# Patient Record
Sex: FEMALE | Race: WHITE | URBAN - METROPOLITAN AREA
[De-identification: names, ages, dates, MRNs, and addresses within clinical notes are randomized per-mention and may not be internally consistent; named-entity substitution may affect disease eponyms.]

---

## 2025-04-16 DIAGNOSIS — Z82.79 FAMILY HISTORY OF BICUSPID AORTIC VALVE: Primary | ICD-10-CM

## 2025-05-09 ENCOUNTER — HOSPITAL ENCOUNTER (OUTPATIENT)
Dept: CARDIOLOGY | Facility: CLINIC | Age: 25
Discharge: HOME | End: 2025-05-09
Payer: COMMERCIAL

## 2025-05-09 DIAGNOSIS — Z82.79 FAMILY HISTORY OF BICUSPID AORTIC VALVE: ICD-10-CM

## 2025-05-09 LAB
AORTIC VALVE PEAK VELOCITY: 1.03 M/S
AV PEAK GRADIENT: 4 MMHG
AVA (PEAK VEL): 3.33 CM2
EJECTION FRACTION APICAL 4 CHAMBER: 65.7
EJECTION FRACTION: 65 %
LEFT ATRIUM VOLUME AREA LENGTH INDEX BSA: 20.5 ML/M2
LEFT VENTRICLE INTERNAL DIMENSION DIASTOLE: 4.49 CM (ref 3.5–6)
LEFT VENTRICULAR OUTFLOW TRACT DIAMETER: 2.08 CM
MITRAL VALVE E/A RATIO: 1.73
RIGHT VENTRICLE FREE WALL PEAK S': 15 CM/S
TRICUSPID ANNULAR PLANE SYSTOLIC EXCURSION: 2.2 CM

## 2025-05-09 PROCEDURE — 93306 TTE W/DOPPLER COMPLETE: CPT | Performed by: INTERNAL MEDICINE

## 2025-05-09 PROCEDURE — 93306 TTE W/DOPPLER COMPLETE: CPT

## 2025-05-12 DIAGNOSIS — R93.1 ABNORMAL ECHOCARDIOGRAM: Primary | ICD-10-CM

## 2025-06-06 PROBLEM — R94.31 EKG ABNORMALITY: Status: ACTIVE | Noted: 2020-11-15

## 2025-06-06 PROBLEM — R55 SYNCOPE: Status: ACTIVE | Noted: 2020-11-15

## 2025-06-06 PROBLEM — F41.9 ANXIETY: Status: ACTIVE | Noted: 2025-06-06

## 2025-06-06 PROBLEM — R00.0 TACHYCARDIA: Status: ACTIVE | Noted: 2020-11-15

## 2025-06-06 PROBLEM — F12.10 MARIJUANA ABUSE: Status: ACTIVE | Noted: 2020-11-15

## 2025-07-01 ENCOUNTER — TELEMEDICINE (OUTPATIENT)
Dept: CARDIOLOGY | Facility: CLINIC | Age: 25
End: 2025-07-01
Payer: COMMERCIAL

## 2025-07-01 DIAGNOSIS — R94.31 ABNORMAL ELECTROCARDIOGRAM (ECG) (EKG): ICD-10-CM

## 2025-07-01 DIAGNOSIS — Q24.8: ICD-10-CM

## 2025-07-01 DIAGNOSIS — Z82.79 FAMILY HISTORY OF BICUSPID AORTIC VALVE: Primary | ICD-10-CM

## 2025-07-01 PROCEDURE — 1036F TOBACCO NON-USER: CPT | Performed by: STUDENT IN AN ORGANIZED HEALTH CARE EDUCATION/TRAINING PROGRAM

## 2025-07-01 PROCEDURE — 99203 OFFICE O/P NEW LOW 30 MIN: CPT | Performed by: STUDENT IN AN ORGANIZED HEALTH CARE EDUCATION/TRAINING PROGRAM

## 2025-07-01 NOTE — PROGRESS NOTES
"Referred by Dr. Doss ref. provider found for No chief complaint on file.     History Of Present Illness:    Shobha Hemphill is a 24 y.o. female without significant past medical history who is here for evaluation of an echocardiogram.  Patient underwent echocardiogram as apparently family member presumably mother was found to have bicuspid aortic valve disease.  Her echocardiogram shows presence of trileaflet aortic valve is mild thickening along the right coronary cusp but no evidence of bicuspid valve or physiology.  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  Patient is asymptomatic at baseline.  She is very functional and works at [employer] and is on her feet all day.  She has no limitations to activity.  Previous ECG from 11/15/2020 shows the presence of sinus rhythm when appears to be right axis deviation incomplete right bundle branch block morphology.      Past Medical History:  She has a past medical history of Acute upper respiratory infection, unspecified (04/25/2014), Other specified health status, Personal history of other diseases of the respiratory system (08/05/2014), Personal history of other diseases of the respiratory system (04/25/2014), and Unspecified otitis externa, left ear (06/26/2014).    Past Surgical History:  She has no past surgical history on file.      Social History:  She reports that she has never smoked. She has never used smokeless tobacco. She reports that she does not currently use alcohol. She reports that she does not use drugs.    Family History:  Family History[1]     Allergies:  Patient has no allergy information on record.    Outpatient Medications:  No current outpatient medications     Last Recorded Vitals:  There were no vitals filed for this visit.    Physical Exam:  Virtual visit  @[facility]->[facility](CIRCULAR REFERENCE)@       Last Labs:  CBC -  No results found for: \"WBC\", \"HGB\", \"HCT\", " "\"MCV\", \"PLT\"    CMP -  No results found for: \"CALCIUM\", \"PHOS\", \"PROT\", \"ALBUMIN\", \"AST\", \"ALT\", \"ALKPHOS\", \"BILITOT\"    LIPID PANEL -   No results found for: \"CHOL\", \"TRIG\", \"HDL\", \"CHHDL\", \"LDLF\", \"VLDL\", \"NHDL\"    RENAL FUNCTION PANEL -   No results found for: \"GLUCOSE\", \"NA\", \"K\", \"CL\", \"CO2\", \"ANIONGAP\", \"BUN\", \"CREATININE\", \"GFRMALE\", \"CALCIUM\", \"PHOS\", \"ALBUMIN\"     No results found for: \"BNP\", \"HGBA1C\"    Last Cardiology Tests:  ECG:  No results found for this or any previous visit from the past 1095 days.    Ejection Fractions:  EF   Date/Time Value Ref Range Status   05/09/2025 11:04 AM 65 %      Assessment/Plan     1.  Family history of bicuspid aortic valve disease: I personally reviewed echocardiographic images.  Findings do not show evidence of bicuspid physiology mild thickening of the right coronary cusp.  Normal LV function.  No evidence of ASD VSD.  There is a linear structure in the right atrium which is consistent with Chiari network which is a normal embryonic remnant.  At present no additional workup is indicated.     2.  Abnormal EKG: I personally reviewed EKG from 2020 which revealed presence of right axis deviation with potential incomplete right bundle branch.  We do not have a repeat EKG for comparison.  Baseline echo reviewed which shows no significant abnormalities.    Follow-up as needed    (This note was generated with voice recognition software and may contain errors including spelling, grammar, syntax and missed recognition of what was dictated, of which may not have been fully corrected)     Josh Hay MD PhD         [1]   Family History  Problem Relation Name Age of Onset    Cancer Mother Vivienne Hemphill 40 - 49    Cancer Paternal Grandfather Fabio Hemphill 60 - 69     "

## 2025-09-03 LAB — NON-UH HIE THINPREP TIS PAP: NORMAL
